# Patient Record
Sex: MALE | Race: WHITE | NOT HISPANIC OR LATINO | Employment: UNEMPLOYED | URBAN - METROPOLITAN AREA
[De-identification: names, ages, dates, MRNs, and addresses within clinical notes are randomized per-mention and may not be internally consistent; named-entity substitution may affect disease eponyms.]

---

## 2019-07-05 ENCOUNTER — APPOINTMENT (OUTPATIENT)
Dept: CT IMAGING | Facility: HOSPITAL | Age: 34
End: 2019-07-05

## 2019-07-05 ENCOUNTER — HOSPITAL ENCOUNTER (EMERGENCY)
Facility: HOSPITAL | Age: 34
Discharge: HOME | End: 2019-07-05
Attending: EMERGENCY MEDICINE | Admitting: EMERGENCY MEDICINE

## 2019-07-05 VITALS
HEIGHT: 73 IN | TEMPERATURE: 97.6 F | WEIGHT: 220 LBS | DIASTOLIC BLOOD PRESSURE: 76 MMHG | SYSTOLIC BLOOD PRESSURE: 133 MMHG | OXYGEN SATURATION: 99 % | BODY MASS INDEX: 29.16 KG/M2 | HEART RATE: 73 BPM | RESPIRATION RATE: 18 BRPM

## 2019-07-05 DIAGNOSIS — G89.29 OTHER CHRONIC PAIN: ICD-10-CM

## 2019-07-05 DIAGNOSIS — R03.0 ELEVATED BLOOD PRESSURE READING: ICD-10-CM

## 2019-07-05 DIAGNOSIS — R10.84 GENERALIZED ABDOMINAL PAIN: Primary | ICD-10-CM

## 2019-07-05 LAB
ALBUMIN SERPL-MCNC: 4.4 G/DL (ref 3.5–5.2)
ALBUMIN/GLOB SERPL: 1.8 G/DL
ALP SERPL-CCNC: 49 U/L (ref 39–117)
ALT SERPL W P-5'-P-CCNC: 21 U/L (ref 1–41)
ANION GAP SERPL CALCULATED.3IONS-SCNC: 15 MMOL/L (ref 5–15)
AST SERPL-CCNC: 13 U/L (ref 1–40)
BASOPHILS # BLD AUTO: 0.07 10*3/MM3 (ref 0–0.2)
BASOPHILS NFR BLD AUTO: 0.8 % (ref 0–1.5)
BILIRUB SERPL-MCNC: 1.6 MG/DL (ref 0.2–1.2)
BILIRUB UR QL STRIP: NEGATIVE
BUN BLD-MCNC: 17 MG/DL (ref 6–20)
BUN/CREAT SERPL: 16.3 (ref 7–25)
CALCIUM SPEC-SCNC: 8.8 MG/DL (ref 8.6–10.5)
CHLORIDE SERPL-SCNC: 101 MMOL/L (ref 98–107)
CK SERPL-CCNC: 41 U/L (ref 20–200)
CLARITY UR: CLEAR
CO2 SERPL-SCNC: 26 MMOL/L (ref 22–29)
COLOR UR: YELLOW
CREAT BLD-MCNC: 1.04 MG/DL (ref 0.76–1.27)
D-LACTATE SERPL-SCNC: 1.5 MMOL/L (ref 0.5–2)
DEPRECATED RDW RBC AUTO: 44.2 FL (ref 37–54)
EOSINOPHIL # BLD AUTO: 0 10*3/MM3 (ref 0–0.4)
EOSINOPHIL NFR BLD AUTO: 0 % (ref 0.3–6.2)
ERYTHROCYTE [DISTWIDTH] IN BLOOD BY AUTOMATED COUNT: 12.8 % (ref 12.3–15.4)
GFR SERPL CREATININE-BSD FRML MDRD: 81 ML/MIN/1.73
GLOBULIN UR ELPH-MCNC: 2.4 GM/DL
GLUCOSE BLD-MCNC: 89 MG/DL (ref 65–99)
GLUCOSE UR STRIP-MCNC: NEGATIVE MG/DL
HCT VFR BLD AUTO: 53.3 % (ref 37.5–51)
HGB BLD-MCNC: 17.2 G/DL (ref 13–17.7)
HGB UR QL STRIP.AUTO: NEGATIVE
HOLD SPECIMEN: NORMAL
HOLD SPECIMEN: NORMAL
IMM GRANULOCYTES # BLD AUTO: 0.29 10*3/MM3 (ref 0–0.05)
IMM GRANULOCYTES NFR BLD AUTO: 3.2 % (ref 0–0.5)
KETONES UR QL STRIP: NEGATIVE
LEUKOCYTE ESTERASE UR QL STRIP.AUTO: NEGATIVE
LIPASE SERPL-CCNC: 22 U/L (ref 13–60)
LYMPHOCYTES # BLD AUTO: 2.41 10*3/MM3 (ref 0.7–3.1)
LYMPHOCYTES NFR BLD AUTO: 26.7 % (ref 19.6–45.3)
MCH RBC QN AUTO: 30.8 PG (ref 26.6–33)
MCHC RBC AUTO-ENTMCNC: 32.3 G/DL (ref 31.5–35.7)
MCV RBC AUTO: 95.3 FL (ref 79–97)
MONOCYTES # BLD AUTO: 0.94 10*3/MM3 (ref 0.1–0.9)
MONOCYTES NFR BLD AUTO: 10.4 % (ref 5–12)
NEUTROPHILS # BLD AUTO: 5.31 10*3/MM3 (ref 1.7–7)
NEUTROPHILS NFR BLD AUTO: 58.9 % (ref 42.7–76)
NITRITE UR QL STRIP: NEGATIVE
NRBC BLD AUTO-RTO: 0 /100 WBC (ref 0–0.2)
PH UR STRIP.AUTO: <=5 [PH] (ref 5–8)
PLATELET # BLD AUTO: 191 10*3/MM3 (ref 140–450)
PMV BLD AUTO: 9.4 FL (ref 6–12)
POTASSIUM BLD-SCNC: 3.6 MMOL/L (ref 3.5–5.2)
PROT SERPL-MCNC: 6.8 G/DL (ref 6–8.5)
PROT UR QL STRIP: NEGATIVE
RBC # BLD AUTO: 5.59 10*6/MM3 (ref 4.14–5.8)
SODIUM BLD-SCNC: 142 MMOL/L (ref 136–145)
SP GR UR STRIP: 1.02 (ref 1–1.03)
UROBILINOGEN UR QL STRIP: NORMAL
WBC NRBC COR # BLD: 9.02 10*3/MM3 (ref 3.4–10.8)
WHOLE BLOOD HOLD SPECIMEN: NORMAL
WHOLE BLOOD HOLD SPECIMEN: NORMAL

## 2019-07-05 PROCEDURE — 25010000002 KETOROLAC TROMETHAMINE PER 15 MG: Performed by: EMERGENCY MEDICINE

## 2019-07-05 PROCEDURE — 80053 COMPREHEN METABOLIC PANEL: CPT

## 2019-07-05 PROCEDURE — 85025 COMPLETE CBC W/AUTO DIFF WBC: CPT

## 2019-07-05 PROCEDURE — 96372 THER/PROPH/DIAG INJ SC/IM: CPT

## 2019-07-05 PROCEDURE — 96374 THER/PROPH/DIAG INJ IV PUSH: CPT

## 2019-07-05 PROCEDURE — 74176 CT ABD & PELVIS W/O CONTRAST: CPT

## 2019-07-05 PROCEDURE — 82550 ASSAY OF CK (CPK): CPT

## 2019-07-05 PROCEDURE — 83690 ASSAY OF LIPASE: CPT

## 2019-07-05 PROCEDURE — 96375 TX/PRO/DX INJ NEW DRUG ADDON: CPT

## 2019-07-05 PROCEDURE — 99284 EMERGENCY DEPT VISIT MOD MDM: CPT

## 2019-07-05 PROCEDURE — 81003 URINALYSIS AUTO W/O SCOPE: CPT

## 2019-07-05 PROCEDURE — 83605 ASSAY OF LACTIC ACID: CPT

## 2019-07-05 PROCEDURE — 25010000002 DICYCLOMINE PER 20 MG: Performed by: EMERGENCY MEDICINE

## 2019-07-05 RX ORDER — DULOXETIN HYDROCHLORIDE 60 MG/1
120 CAPSULE, DELAYED RELEASE ORAL DAILY
COMMUNITY

## 2019-07-05 RX ORDER — DICYCLOMINE HYDROCHLORIDE 10 MG/ML
20 INJECTION INTRAMUSCULAR ONCE
Status: COMPLETED | OUTPATIENT
Start: 2019-07-05 | End: 2019-07-05

## 2019-07-05 RX ORDER — SACCHAROMYCES BOULARDII 250 MG
250 CAPSULE ORAL 2 TIMES DAILY
Qty: 20 CAPSULE | Refills: 0 | Status: SHIPPED | OUTPATIENT
Start: 2019-07-05 | End: 2019-07-15

## 2019-07-05 RX ORDER — CLONAZEPAM 1 MG/1
2 TABLET ORAL 3 TIMES DAILY PRN
COMMUNITY

## 2019-07-05 RX ORDER — SODIUM CHLORIDE 0.9 % (FLUSH) 0.9 %
10 SYRINGE (ML) INJECTION AS NEEDED
Status: DISCONTINUED | OUTPATIENT
Start: 2019-07-05 | End: 2019-07-05 | Stop reason: HOSPADM

## 2019-07-05 RX ORDER — FAMOTIDINE 10 MG/ML
20 INJECTION, SOLUTION INTRAVENOUS ONCE
Status: COMPLETED | OUTPATIENT
Start: 2019-07-05 | End: 2019-07-05

## 2019-07-05 RX ORDER — KETOROLAC TROMETHAMINE 15 MG/ML
10 INJECTION, SOLUTION INTRAMUSCULAR; INTRAVENOUS ONCE
Status: COMPLETED | OUTPATIENT
Start: 2019-07-05 | End: 2019-07-05

## 2019-07-05 RX ORDER — RANITIDINE 150 MG/1
150 TABLET ORAL 2 TIMES DAILY
Qty: 28 TABLET | Refills: 0 | Status: SHIPPED | OUTPATIENT
Start: 2019-07-05

## 2019-07-05 RX ORDER — HYDROCODONE BITARTRATE AND ACETAMINOPHEN 7.5; 325 MG/1; MG/1
1 TABLET ORAL 2 TIMES DAILY
COMMUNITY

## 2019-07-05 RX ADMIN — FAMOTIDINE 20 MG: 10 INJECTION, SOLUTION INTRAVENOUS at 16:56

## 2019-07-05 RX ADMIN — DICYCLOMINE HYDROCHLORIDE 20 MG: 20 INJECTION, SOLUTION INTRAMUSCULAR at 16:59

## 2019-07-05 RX ADMIN — KETOROLAC TROMETHAMINE 10 MG: 15 INJECTION, SOLUTION INTRAMUSCULAR; INTRAVENOUS at 16:54

## 2019-07-05 RX ADMIN — SODIUM CHLORIDE, POTASSIUM CHLORIDE, SODIUM LACTATE AND CALCIUM CHLORIDE 1000 ML: 600; 310; 30; 20 INJECTION, SOLUTION INTRAVENOUS at 16:57

## 2019-07-05 NOTE — ED PROVIDER NOTES
Subjective   The patient presents to the emergency department with 6 months of progressive abdominal pain.  Patient reports having abdominal pain ever since having the flu in January.  Patient reports worsening upper abdominal pain for about the last month.  During this time he has seen his primary care physician with evaluations and CT scan performed about a month ago.  There was no etiology.  Patient does report generalized muscle weakness and fatigue as well as weight loss.  Patient has had a cholecystectomy and the symptoms did not improve.  Patient has seen a gastroenterologist though his last visit was approximately 1 year ago where a colonoscopy was performed.  Patient denies any fever or chills.  No chest pain or shortness of breath.  No pain with urination.  No blood in stool.  No other acute symptoms or complaints.  No other significant past medical history.        History provided by:  Patient and spouse      Review of Systems   Constitutional: Positive for fever and unexpected weight change. Negative for chills and fatigue.   Respiratory: Negative.    Cardiovascular: Negative.    Gastrointestinal: Positive for abdominal pain. Negative for blood in stool, diarrhea and vomiting.   Musculoskeletal: Negative.    Skin: Negative.    Neurological: Positive for weakness.   Psychiatric/Behavioral: Negative.    All other systems reviewed and are negative.      Past Medical History:   Diagnosis Date   • Anxiety    • Depression    • Hand injury        No Known Allergies    Past Surgical History:   Procedure Laterality Date   • CHOLECYSTECTOMY     • HAND SURGERY Left        History reviewed. No pertinent family history.    Social History     Socioeconomic History   • Marital status:      Spouse name: Not on file   • Number of children: Not on file   • Years of education: Not on file   • Highest education level: Not on file   Tobacco Use   • Smoking status: Never Smoker   Substance and Sexual Activity   •  Alcohol use: Yes     Comment: SOCIALLY   • Drug use: No           Objective   Physical Exam   Constitutional: He is oriented to person, place, and time. He appears well-developed and well-nourished.   Cardiovascular: Normal rate, regular rhythm and intact distal pulses.   Pulmonary/Chest: Effort normal and breath sounds normal. No respiratory distress.   Abdominal: Normal appearance. There is generalized tenderness. There is guarding. There is no rigidity.   Patient has generalized abdominal pain to superficial palpation which is worst in the upper abdomen/epigastric region.  There is voluntary guarding to deeper palpation.  No rigidity or surgical signs.  Patient states this is the same symptoms that have been going on for for approximately 6 months with worsening over the last month.   Neurological: He is alert and oriented to person, place, and time.   Skin: Skin is warm and dry. Capillary refill takes less than 2 seconds.   Psychiatric: He has a normal mood and affect. His behavior is normal.   Nursing note and vitals reviewed.      Procedures           ED Course  ED Course as of Jul 06 0118 Fri Jul 05, 2019   1603 Lactate: 1.5 [RS]   1614 The patient has been having symptoms ongoing for about 6 months ever since the flu.  I advised the patient and his wife that it is incredibly unlikely that we will find an answer or solution in the emergency department today on a Friday of a holiday weekend through the emergency department.  I discussed the potential differential though it is incredibly wide with very nonspecific findings at this point.  I advised how important it is to follow-up with his gastroenterologist who he has not seen since last year even though he has had abdominal pain for 6 months.  He is only been following up with his primary care physician with negative evaluations as far.  I think it is incredibly unlikely that we find any significant abnormalities today and I feel like the patient and family  are incredibly frustrated, but I apologized and stated that this was not the setting where we would be able to find and establish these more chronic diagnoses.  [RS]   1756 CT scan demonstrates no emergent findings. CT Abdomen Pelvis Without Contrast [RS]   1756 I had a discussion with the patient/family regarding diagnosis, diagnostic results, treatment plan, and medications.  The patient/family indicated understanding of these instructions.  I spent adequate time at the bedside proceeding discharge necessary to personally discuss the aftercare instructions, giving patient education, providing explanations of the results of our evaluations/findings, and my decision making to assure that the patient/family understand the plan of care.  Time was allotted to answer questions at that time and throughout the ED course.  Emphasis was placed on timely follow-up after discharge.  I also discussed the potential for the development of an acute emergent condition requiring further evaluation, admission, or even surgical intervention. I discussed that we found nothing during the visit today indicating the need for further workup, admission, or the presence of an unstable medical condition.  I encouraged the patient to return to the emergency department immediately for ANY concerns, worsening, new complaints, or if symptoms persist and unable to seek follow-up in a timely fashion.  The patient/family expressed understanding and agreement with this plan.   [RS]      ED Course User Index  [RS] Jamaal Maria MD                  MDM  Number of Diagnoses or Management Options  Elevated blood pressure reading:   Generalized abdominal pain:   Other chronic pain:   Diagnosis management comments: Recent Results (from the past 24 hour(s))  -Comprehensive Metabolic Panel  Collection Time: 07/05/19  2:05 PM       Result                      Value             Ref Range           Glucose                     89                65 - 99  mg/dL       BUN                         17                6 - 20 mg/dL        Creatinine                  1.04              0.76 - 1.27 *       Sodium                      142               136 - 145 mm*       Potassium                   3.6               3.5 - 5.2 mm*       Chloride                    101               98 - 107 mmo*       CO2                         26.0              22.0 - 29.0 *       Calcium                     8.8               8.6 - 10.5 m*       Total Protein               6.8               6.0 - 8.5 g/*       Albumin                     4.40              3.50 - 5.20 *       ALT (SGPT)                  21                1 - 41 U/L          AST (SGOT)                  13                1 - 40 U/L          Alkaline Phosphatase        49                39 - 117 U/L        Total Bilirubin             1.6 (H)           0.2 - 1.2 mg*       eGFR Non  Amer       81                >60 mL/min/1*       Globulin                    2.4               gm/dL               A/G Ratio                   1.8               g/dL                BUN/Creatinine Ratio        16.3              7.0 - 25.0          Anion Gap                   15.0              5.0 - 15.0 m*  -Lipase  Collection Time: 07/05/19  2:05 PM       Result                      Value             Ref Range           Lipase                      22                13 - 60 U/L    -Urinalysis With Microscopic If Indicated (No Culture) - Urine, Clean Catch  Collection Time: 07/05/19  2:05 PM       Result                      Value             Ref Range           Color, UA                   Yellow            Yellow, Straw       Appearance, UA              Clear             Clear               pH, UA                      <=5.0             5.0 - 8.0           Specific Dozier, UA        1.016             1.001 - 1.030       Glucose, UA                 Negative          Negative            Ketones, UA                 Negative          Negative             Bilirubin, UA               Negative          Negative            Blood, UA                   Negative          Negative            Protein, UA                 Negative          Negative            Leuk Esterase, UA           Negative          Negative            Nitrite, UA                 Negative          Negative            Urobilinogen, UA            0.2 E.U./dL       0.2 - 1.0 E.*  -CK  Collection Time: 07/05/19  2:05 PM       Result                      Value             Ref Range           Creatine Kinase             41                20 - 200 U/L   -Lactic Acid, Plasma  Collection Time: 07/05/19  2:05 PM       Result                      Value             Ref Range           Lactate                     1.5               0.5 - 2.0 mm*  -Light Blue Top  Collection Time: 07/05/19  2:05 PM       Result                      Value             Ref Range           Extra Tube                                                    hold for add-on  -Green Top (Gel)  Collection Time: 07/05/19  2:05 PM       Result                      Value             Ref Range           Extra Tube                                                    Hold for add-ons.  -Lavender Top  Collection Time: 07/05/19  2:05 PM       Result                      Value             Ref Range           Extra Tube                                                    hold for add-on  -Gold Top - SST  Collection Time: 07/05/19  2:05 PM       Result                      Value             Ref Range           Extra Tube                                                    Hold for add-ons.  -CBC Auto Differential  Collection Time: 07/05/19  2:05 PM       Result                      Value             Ref Range           WBC                         9.02              3.40 - 10.80*       RBC                         5.59              4.14 - 5.80 *       Hemoglobin                  17.2              13.0 - 17.7 *       Hematocrit                  53.3 (H)           37.5 - 51.0 %       MCV                         95.3              79.0 - 97.0 *       MCH                         30.8              26.6 - 33.0 *       MCHC                        32.3              31.5 - 35.7 *       RDW                         12.8              12.3 - 15.4 %       RDW-SD                      44.2              37.0 - 54.0 *       MPV                         9.4               6.0 - 12.0 fL       Platelets                   191               140 - 450 10*       Neutrophil %                58.9              42.7 - 76.0 %       Lymphocyte %                26.7              19.6 - 45.3 %       Monocyte %                  10.4              5.0 - 12.0 %        Eosinophil %                0.0 (L)           0.3 - 6.2 %         Basophil %                  0.8               0.0 - 1.5 %         Immature Grans %            3.2 (H)           0.0 - 0.5 %         Neutrophils, Absolute       5.31              1.70 - 7.00 *       Lymphocytes, Absolute       2.41              0.70 - 3.10 *       Monocytes, Absolute         0.94 (H)          0.10 - 0.90 *       Eosinophils, Absolute       0.00              0.00 - 0.40 *       Basophils, Absolute         0.07              0.00 - 0.20 *       Immature Grans, Absolu*     0.29 (H)          0.00 - 0.05 *       nRBC                        0.0               0.0 - 0.2 /1*  Note: In addition to lab results from this visit, the labs listed above may include labs taken at another facility or during a different encounter within the last 24 hours. Please correlate lab times with ED admission and discharge times for further clarification of the services performed during this visit.    CT Abdomen Pelvis Without Contrast   Final Result    No CT evidence of acute intra-abdominal or pelvic    abnormality.         DICTATED:   07/05/2019    EDITED/ls :   07/05/2019               This report was finalized on 7/5/2019 6:53 PM by Dr. Sujey Grimaldo MD.         "  --------------------------------------               07/05/19 07/05/19                  1318          1550     --------------------------------------   BP:          139/86        133/76     BP Location:    Left arm      Left arm    Patient Position:     Sitting      Sitting     Pulse:         83            73       Resp:          18            18       Temp:   97.6 °F (36.4 °C)             TempSrc:      Oral                    SpO2:         100%          99%       Weight: 99.8 kg (220 lb)              Height:  185.4 cm (73\")              --------------------------------------  Medications  lactated ringers bolus 1,000 mL (0 mL Intravenous Stopped 7/5/19 1822)  famotidine (PEPCID) injection 20 mg (20 mg Intravenous Given 7/5/19 1656)  dicyclomine (BENTYL) injection 20 mg (20 mg Intramuscular Given 7/5/19 1659)   ketorolac (TORADOL) injection 10 mg (10 mg Intravenous Given 7/5/19 1654)  ECG/EMG Results (last 24 hours)     ** No results found for the last 24 hours. **      No orders to display         Amount and/or Complexity of Data Reviewed  Clinical lab tests: reviewed  Tests in the radiology section of CPT®: reviewed  Independent visualization of images, tracings, or specimens: yes          Final diagnoses:   Generalized abdominal pain   Other chronic pain   Elevated blood pressure reading            Jamaal Maria MD  07/06/19 0118    "

## 2019-10-24 ENCOUNTER — TRANSCRIBE ORDERS (OUTPATIENT)
Dept: FAMILY MEDICINE CLINIC | Facility: CLINIC | Age: 34
End: 2019-10-24

## 2019-10-24 DIAGNOSIS — R68.81 EARLY SATIETY: Primary | ICD-10-CM
